# Patient Record
Sex: FEMALE | Race: WHITE | NOT HISPANIC OR LATINO | Employment: FULL TIME | URBAN - METROPOLITAN AREA
[De-identification: names, ages, dates, MRNs, and addresses within clinical notes are randomized per-mention and may not be internally consistent; named-entity substitution may affect disease eponyms.]

---

## 2024-06-18 ENCOUNTER — HOSPITAL ENCOUNTER (EMERGENCY)
Facility: HOSPITAL | Age: 39
Discharge: HOME | End: 2024-06-18
Attending: EMERGENCY MEDICINE
Payer: COMMERCIAL

## 2024-06-18 VITALS
SYSTOLIC BLOOD PRESSURE: 122 MMHG | TEMPERATURE: 99 F | WEIGHT: 162 LBS | HEART RATE: 56 BPM | RESPIRATION RATE: 18 BRPM | OXYGEN SATURATION: 100 % | HEIGHT: 60 IN | DIASTOLIC BLOOD PRESSURE: 74 MMHG | BODY MASS INDEX: 31.8 KG/M2

## 2024-06-18 DIAGNOSIS — S42.321A CLOSED DISPLACED TRANSVERSE FRACTURE OF SHAFT OF RIGHT HUMERUS, INITIAL ENCOUNTER: Primary | ICD-10-CM
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
## 2024-06-18 LAB — B-HCG UR QL: POSITIVE

## 2024-06-18 PROCEDURE — 99283 EMERGENCY DEPT VISIT LOW MDM: CPT | Mod: 25

## 2024-06-18 PROCEDURE — 25000003 PHARM REV CODE 250: Performed by: EMERGENCY MEDICINE

## 2024-06-18 PROCEDURE — 81025 URINE PREGNANCY TEST: CPT | Performed by: EMERGENCY MEDICINE

## 2024-06-18 RX ORDER — OXYCODONE AND ACETAMINOPHEN 5; 325 MG/1; MG/1
1 TABLET ORAL EVERY 6 HOURS PRN
Qty: 12 TABLET | Refills: 0 | Status: SHIPPED | OUTPATIENT
Start: 2024-06-18 | End: 2024-06-20 | Stop reason: SDUPTHER

## 2024-06-18 RX ORDER — HYDROCODONE BITARTRATE AND ACETAMINOPHEN 5; 325 MG/1; MG/1
1 TABLET ORAL
Status: COMPLETED | OUTPATIENT
Start: 2024-06-18 | End: 2024-06-18

## 2024-06-18 RX ADMIN — HYDROCODONE BITARTRATE AND ACETAMINOPHEN 1 TABLET: 5; 325 TABLET ORAL at 10:06

## 2024-06-18 NOTE — ED PROVIDER NOTES
Encounter Date: 6/18/2024       History     Chief Complaint   Patient presents with    Fall     C/o right shoulder and elbow pain , right hip and back pain s/p fall from desk (3 feet) onto her right side, denies hitting head, unwitnessed fall. Denies nausea.     39-year-old female history of anxiety sent from her doctor's office for right arm injury.  Patient fell off a desk about 3 ft high landing on her right arm.  Patient complains of right shoulder, right arm and right elbow pain.  She also complains of some low back pain.  No lower extremity numbness, weakness, saddle anesthesia, incontinence/retention.  Patient said she initially had numbness in her right hand and arm but something shifted and now she only has some numbness in her pinky.  Patient denies head trauma, loss of consciousness, neck pain.  No anticoagulation.  Patient was given Toradol 60 mg IM and placed in sling by her PCP    The history is provided by the patient.     Review of patient's allergies indicates:  No Known Allergies  No past medical history on file.  No past surgical history on file.  No family history on file.     Review of Systems   Constitutional:  Negative for chills, diaphoresis, fatigue and fever.   HENT:  Negative for congestion, drooling, ear discharge, ear pain, postnasal drip, rhinorrhea, sinus pressure, sinus pain, sore throat and tinnitus.    Eyes:  Negative for discharge.   Respiratory:  Negative for cough, chest tightness, shortness of breath and wheezing.    Cardiovascular:  Negative for chest pain and palpitations.   Gastrointestinal:  Negative for abdominal pain, diarrhea, nausea and vomiting.   Genitourinary:  Negative for frequency, hematuria and urgency.   Musculoskeletal:  Positive for back pain. Negative for neck pain and neck stiffness.   Skin:  Negative for rash.   Neurological:  Negative for syncope, weakness, light-headedness, numbness and headaches.   Psychiatric/Behavioral:  Negative for suicidal ideas.         Physical Exam     Initial Vitals [06/18/24 0944]   BP Pulse Resp Temp SpO2   121/79 82 18 98.6 °F (37 °C) 97 %      MAP       --         Physical Exam    Nursing note and vitals reviewed.  Constitutional: She appears well-developed and well-nourished. No distress.   HENT:   Head: Atraumatic.   Cardiovascular:  Normal rate.           Pulmonary/Chest: No respiratory distress. She exhibits no tenderness.   Abdominal: There is no abdominal tenderness.   Musculoskeletal:      Right shoulder: No swelling, deformity or bony tenderness. Decreased range of motion.      Right upper arm: Swelling, tenderness and bony tenderness present. No lacerations.      Right elbow: Decreased range of motion. Tenderness present.      Right forearm: No bony tenderness.      Right wrist: No bony tenderness. Normal range of motion. Normal pulse.      Right hand: No tenderness. Normal range of motion. Normal strength. Normal sensation.      Cervical back: Normal.      Thoracic back: Normal.      Lumbar back: No bony tenderness. Negative right straight leg raise test and negative left straight leg raise test.      Right hip: Normal.      Left hip: Normal.      Right upper leg: Normal.      Left upper leg: Normal.      Right knee: Normal.      Left knee: Normal.      Right lower leg: Normal.      Left lower leg: Normal.      Right ankle: Normal.      Left ankle: Normal.     Neurological: She is alert and oriented to person, place, and time. She has normal strength. No sensory deficit. GCS score is 15. GCS eye subscore is 4. GCS verbal subscore is 5. GCS motor subscore is 6.         ED Course   Procedures  Labs Reviewed   PREGNANCY TEST, URINE RAPID - Abnormal; Notable for the following components:       Result Value    hCG Qualitative, Urine Positive (*)     All other components within normal limits          Imaging Results              X-Ray Humerus 2 View Right (Final result)  Result time 06/18/24 11:09:36      Final result by Bennie  Umer FITZPATRICK MD (06/18/24 11:09:36)                   Impression:      Fracture of the midshaft humerus.      Electronically signed by: Umer Avery MD  Date:    06/18/2024  Time:    11:09               Narrative:    EXAMINATION:  XR HUMERUS 2 VIEW RIGHT    CLINICAL HISTORY:  Unspecified fall, initial encounter    COMPARISON:  None    FINDINGS:  There is a mildly oblique diaphyseal fracture of the midshaft humerus.  Fracture is displaced.                                       X-Ray Elbow Complete Right (Final result)  Result time 06/18/24 11:10:10      Final result by Umer Avery MD (06/18/24 11:10:10)                   Impression:      No acute abnormalities are seen      Electronically signed by: Umer Avery MD  Date:    06/18/2024  Time:    11:10               Narrative:    EXAMINATION:  XR ELBOW COMPLETE 3 VIEW RIGHT    CLINICAL HISTORY:  . Unspecified fall, initial encounter    TECHNIQUE:  AP, lateral, and oblique views of the right elbow were performed.    COMPARISON:  None    FINDINGS:  There are no acute fractures seen.  There is no dislocation.  There is no intra-articular effusion.                                       X-Ray Shoulder Trauma Right (Final result)  Result time 06/18/24 11:09:06      Final result by Umer Avery MD (06/18/24 11:09:06)                   Impression:      Diaphyseal fracture of the humerus.      Electronically signed by: Umer Avery MD  Date:    06/18/2024  Time:    11:09               Narrative:    EXAMINATION:  XR SHOULDER TRAUMA 3 VIEW RIGHT    CLINICAL HISTORY:  Unspecified fall, initial encounter    TECHNIQUE:  Three or four views of the right shoulder were performed.    COMPARISON:  None    FINDINGS:  There is an oblique fracture of the humeral diaphysis.  The humeral head is not dislocated.                                       Medications   HYDROcodone-acetaminophen 5-325 mg per tablet 1 tablet (1 tablet Oral Given 6/18/24 1014)     Medical Decision  Making  Medical Decision Making  Problem list/ differential diagnosis including but not limited to:  Head trauma, spinal cord injury, vertebral fracture, fracture of right upper extremity, nerve compression, arterial injury, rib fracture,      Patient's chronic illnesses impacting care:  none    Diagnostic test considered but not ordered:    My interpretations:  Humerus x-ray:  +Humerus fracture    Radiology reports      Discussion of case with external qualified healthcare professionals:  Not applicable    Review of external notes( inpt, ems, NH, clinic):      Decision rules/scores:    Medications reviewed:  Toradol IM  Medications ordered in the ER:  [place]  Discharge prescriptions:  Percocet    Social variables possible impacting patient's healthcare:    Code status/discussion    Shared decision making:    Consideration for admission versus discharge: stable for discharge     Amount and/or Complexity of Data Reviewed  Labs:  Decision-making details documented in ED Course.  Radiology: ordered.    Risk  Prescription drug management.               ED Course as of 06/18/24 1308   Tue Jun 18, 2024   1033 hCG Qualitative, Urine(!): Positive [WC]      ED Course User Index  [WC] Carroll Yao MD                           Clinical Impression:  Final diagnoses:  [W19.XXXA] Fall  [S42.321A] Closed displaced transverse fracture of shaft of right humerus, initial encounter (Primary)          ED Disposition Condition    Discharge Stable          ED Prescriptions       Medication Sig Dispense Start Date End Date Auth. Provider    oxyCODONE-acetaminophen (PERCOCET) 5-325 mg per tablet Take 1 tablet by mouth every 6 (six) hours as needed for Pain. 12 tablet 6/18/2024 -- Carroll Yao MD          Follow-up Information       Follow up With Specialties Details Why Contact Info    Edu Flores, DO Orthopedic Surgery On 6/20/2024  [facility] [facility]  [address]  [facility] [address]  [phone]      [facility]  Orthopaedics - Emergency Dept Emergency Medicine   [address]  [place] [id number] [phone]             Carroll Yao MD  06/18/24 8366

## 2024-06-18 NOTE — ED TRIAGE NOTES
C/o right shoulder and elbow pain , right hip and back pain s/p fall from desk (3 feet) onto her right side, denies hitting head, unwitnessed fall. Denies nausea.

## 2024-06-19 ENCOUNTER — LAB VISIT (OUTPATIENT)
Dept: LAB | Facility: HOSPITAL | Age: 39
End: 2024-06-19
Attending: FAMILY MEDICINE
Payer: COMMERCIAL

## 2024-06-19 DIAGNOSIS — Z3A.01 LESS THAN 8 WEEKS GESTATION OF PREGNANCY: Primary | ICD-10-CM

## 2024-06-19 DIAGNOSIS — S42.301A FRACTURE OF RIGHT HUMERUS: Primary | ICD-10-CM

## 2024-06-19 LAB — B-HCG FREE SERPL-ACNC: <2.42 MIU/ML

## 2024-06-19 PROCEDURE — 36415 COLL VENOUS BLD VENIPUNCTURE: CPT

## 2024-06-19 PROCEDURE — 84702 CHORIONIC GONADOTROPIN TEST: CPT

## 2024-06-20 ENCOUNTER — OFFICE VISIT (OUTPATIENT)
Dept: ORTHOPEDICS | Facility: CLINIC | Age: 39
End: 2024-06-20
Payer: COMMERCIAL

## 2024-06-20 ENCOUNTER — HOSPITAL ENCOUNTER (OUTPATIENT)
Facility: HOSPITAL | Age: 39
Discharge: HOME | End: 2024-06-20
Attending: ORTHOPAEDIC SURGERY | Admitting: ORTHOPAEDIC SURGERY
Payer: COMMERCIAL

## 2024-06-20 VITALS
WEIGHT: 162.06 LBS | BODY MASS INDEX: 31.82 KG/M2 | HEART RATE: 81 BPM | HEIGHT: 60 IN | SYSTOLIC BLOOD PRESSURE: 152 MMHG | DIASTOLIC BLOOD PRESSURE: 74 MMHG

## 2024-06-20 DIAGNOSIS — Z34.90 PREGNANCY, UNSPECIFIED GESTATIONAL AGE: ICD-10-CM

## 2024-06-20 DIAGNOSIS — S42.301A FRACTURE OF RIGHT HUMERUS: Primary | ICD-10-CM

## 2024-06-20 DIAGNOSIS — S42.321A CLOSED DISPLACED TRANSVERSE FRACTURE OF SHAFT OF RIGHT HUMERUS, INITIAL ENCOUNTER: ICD-10-CM

## 2024-06-20 DIAGNOSIS — S42.391A OTHER FRACTURE OF SHAFT OF RIGHT HUMERUS, INITIAL ENCOUNTER FOR CLOSED FRACTURE: Primary | ICD-10-CM

## 2024-06-20 PROCEDURE — 3008F BODY MASS INDEX DOCD: CPT | Mod: CPTII,,, | Performed by: ORTHOPAEDIC SURGERY

## 2024-06-20 PROCEDURE — 3078F DIAST BP <80 MM HG: CPT | Mod: CPTII,,, | Performed by: ORTHOPAEDIC SURGERY

## 2024-06-20 PROCEDURE — 99499 UNLISTED E&M SERVICE: CPT | Mod: ,,, | Performed by: ORTHOPAEDIC SURGERY

## 2024-06-20 PROCEDURE — 4010F ACE/ARB THERAPY RXD/TAKEN: CPT | Mod: CPTII,,, | Performed by: ORTHOPAEDIC SURGERY

## 2024-06-20 PROCEDURE — 99204 OFFICE O/P NEW MOD 45 MIN: CPT | Mod: 57,,, | Performed by: ORTHOPAEDIC SURGERY

## 2024-06-20 PROCEDURE — 1159F MED LIST DOCD IN RCRD: CPT | Mod: CPTII,,, | Performed by: ORTHOPAEDIC SURGERY

## 2024-06-20 PROCEDURE — 3077F SYST BP >= 140 MM HG: CPT | Mod: CPTII,,, | Performed by: ORTHOPAEDIC SURGERY

## 2024-06-20 RX ORDER — IBUPROFEN 800 MG/1
800 TABLET ORAL EVERY 6 HOURS PRN
COMMUNITY
Start: 2024-04-04

## 2024-06-20 RX ORDER — DOCUSATE SODIUM 100 MG/1
CAPSULE, LIQUID FILLED ORAL
COMMUNITY
Start: 2024-03-29

## 2024-06-20 RX ORDER — LABETALOL 200 MG/1
400 TABLET, FILM COATED ORAL
COMMUNITY
Start: 2024-03-29 | End: 2025-03-29

## 2024-06-20 RX ORDER — LEVOTHYROXINE SODIUM 88 UG/1
TABLET ORAL
COMMUNITY

## 2024-06-20 RX ORDER — MUPIROCIN 20 MG/G
OINTMENT TOPICAL
OUTPATIENT
Start: 2024-06-20

## 2024-06-20 RX ORDER — OXYCODONE AND ACETAMINOPHEN 5; 325 MG/1; MG/1
1 TABLET ORAL EVERY 4 HOURS PRN
Qty: 42 TABLET | Refills: 0 | Status: SHIPPED | OUTPATIENT
Start: 2024-06-20 | End: 2024-06-27

## 2024-06-20 RX ORDER — AMLODIPINE BESYLATE 5 MG/1
5 TABLET ORAL
COMMUNITY
Start: 2024-05-22

## 2024-06-20 RX ORDER — TRAZODONE HYDROCHLORIDE 50 MG/1
50 TABLET ORAL NIGHTLY PRN
COMMUNITY
Start: 2024-05-31

## 2024-06-20 RX ORDER — HYDROXYZINE HYDROCHLORIDE 50 MG/1
50 TABLET, FILM COATED ORAL EVERY 6 HOURS PRN
COMMUNITY
Start: 2024-04-01

## 2024-06-20 RX ORDER — PAROXETINE HYDROCHLORIDE 20 MG/1
1 TABLET, FILM COATED ORAL NIGHTLY
COMMUNITY
Start: 2024-03-29 | End: 2025-03-29

## 2024-06-20 RX ORDER — SODIUM CHLORIDE 0.9 % (FLUSH) 0.9 %
10 SYRINGE (ML) INJECTION
Status: SHIPPED | OUTPATIENT
Start: 2024-06-20

## 2024-06-20 RX ORDER — LOSARTAN POTASSIUM 100 MG/1
100 TABLET ORAL
COMMUNITY
Start: 2024-05-22

## 2024-06-20 RX ORDER — DESVENLAFAXINE SUCCINATE 50 MG/1
50 TABLET, EXTENDED RELEASE ORAL
COMMUNITY
Start: 2024-05-31

## 2024-06-20 RX ORDER — NAPROXEN SODIUM 220 MG/1
TABLET, FILM COATED ORAL
COMMUNITY

## 2024-06-20 RX ORDER — LORAZEPAM 1 MG/1
1 TABLET ORAL EVERY 4 HOURS PRN
COMMUNITY
Start: 2024-03-29

## 2024-06-20 RX ORDER — FERROUS SULFATE 325(65) MG
1 TABLET ORAL DAILY
COMMUNITY
Start: 2024-03-30

## 2024-06-20 NOTE — PROGRESS NOTES
Subjective:       Patient ID: Elissa Sorenson is a 39 y.o. female.  Chief Complaint   Patient presents with    Injury     2 days, RIGHT HUMERUS SHAFT FX, ER 6/18/24, states she was on a desk when she fell falling on her arm, has been in constant pain, presents in sling, has no other complaints at this time        HPI:  Patient is a 39-year-old female right-hand dominant.  She did have a positive pregnancy test 2 days ago at the hospital.  Recent beta hCG shows below normal levels.  There is unlikely chance she is pregnant although she is getting serial beta-hCGs.  Patient has no numbness no tingling she does have pain to the right humerus as expected.  She is right-hand dominant she is a nonsmoker currently moving her house.  She fell while climbing on a counter.  She is here with her mother today.    ROS:  Constitutional: Denies fever chills  Eyes: No change in vision  ENT: No ringing or current infections  CV: No chest pain  Resp: No labored breathing  MSK: Pain evident at site of injury located in HPI,   Integ: No signs of abrasions or lacerations  Neuro: No numbness or tingling  Lymphatic: No swelling outside the area of injury     Current Outpatient Medications on File Prior to Visit   Medication Sig Dispense Refill    amLODIPine (NORVASC) 5 MG tablet Take 5 mg by mouth.      aspirin 81 MG Chew Chew 1 tablet every day by oral route.      desvenlafaxine succinate (PRISTIQ) 50 MG Tb24 Take 50 mg by mouth.      docusate sodium (COLACE) 100 MG capsule Take by mouth.      ferrous sulfate (FEOSOL) 325 mg (65 mg iron) Tab tablet Take 1 tablet by mouth once daily.      hydrOXYzine (ATARAX) 50 MG tablet Take 50 mg by mouth every 6 (six) hours as needed.      ibuprofen (ADVIL,MOTRIN) 800 MG tablet Take 800 mg by mouth every 6 (six) hours as needed.      labetaloL (NORMODYNE) 200 MG tablet Take 400 mg by mouth.      levothyroxine (SYNTHROID) 88 MCG tablet       LORazepam (ATIVAN) 1 MG tablet Take 1 mg by mouth  "every 4 (four) hours as needed.      losartan (COZAAR) 100 MG tablet Take 100 mg by mouth.      oxyCODONE-acetaminophen (PERCOCET) 5-325 mg per tablet Take 1 tablet by mouth every 6 (six) hours as needed for Pain. 12 tablet 0    paroxetine (PAXIL) 20 MG tablet Take 1 tablet by mouth every evening.      traZODone (DESYREL) 50 MG tablet Take 50 mg by mouth nightly as needed.       No current facility-administered medications on file prior to visit.          Objective:      BP (!) 152/74   Pulse 81   Ht 5' (1.524 m)   Wt 73.5 kg (162 lb 0.6 oz)   BMI 31.65 kg/m²   General the patient is alert and oriented x3 no acute distress nontoxic-appearing appropriate affect.    Constitutional: Vital signs are examined and stable.  Resp: No signs of labored breathing              RUE: -Skin:  Splint intact No signs of new abrasions or lacerations, no scars           -MSK: STR 5/5 AIN/PIN/Median/Radial/Ulnar motor,           -Neuro:  Sensation intact to light touch C5-T1 dermatomes           -Lymphatic: No signs of  lymphadenopathy,            -CV:  Capillary refill is less than 2 seconds. Radial and ulnar pulses 2/4. Compartments soft and compressible        Body mass index is 31.65 kg/m².  Ideal body weight: 45.5 kg (100 lb 4.9 oz)  Adjusted ideal body weight: 56.7 kg (125 lb)  No results found for: "HGBA1C"  No results found for: "HGB"  No results found for: "ODHWQNRR90ZE"  No results found for: "WBC"    Radiology:  Two views right humerus June 18, 2024 showing short oblique versus transverse humeral shaft fracture completely displaced        Assessment:         1. Fracture of right humerus  Ambulatory referral/consult to Orthopedics    Vital signs    Cleanse with Chlorhexidine (CHG)    Diet NPO    Place VIBHA hose    Place sequential compression device    Chlorohexidine Gluconate Bath    Case Request Operating Room: ORIF, FRACTURE, HUMERUS    Full code    Place in Outpatient    Cleanse with Chlorhexidine (CHG)    Diet NPO    " Place VIBHA hose    Place sequential compression device      2. Pregnancy, unspecified gestational age  HCG, Quantitative              Plan:         No follow-ups on file.    Elissa was seen today for injury.    Diagnoses and all orders for this visit:    Fracture of right humerus  -     Ambulatory referral/consult to Orthopedics  -     Vital signs; Standing  -     Cleanse with Chlorhexidine (CHG); Standing  -     Diet NPO; Standing  -     Place VIBHA hose; Standing  -     Place sequential compression device; Standing  -     Chlorohexidine Gluconate Bath; Standing  -     Case Request Operating Room: ORIF, FRACTURE, HUMERUS  -     Full code; Standing  -     Place in Outpatient; Standing  -     Cleanse with Chlorhexidine (CHG)  -     Diet NPO  -     Place VIBHA hose  -     Place sequential compression device    Pregnancy, unspecified gestational age  -     HCG, Quantitative; Future    Other orders  -     sodium chloride 0.9% flush 10 mL  -     IP VTE LOW RISK PATIENT; Standing  -     mupirocin 2 % ointment  -     ceFAZolin (ANCEF) 2 g in dextrose 5 % (D5W) 50 mL IVPB  -     IP VTE LOW RISK PATIENT      Patient has a transverse versus oblique right humeral shaft fracture.  No numbness or tingling on today's exam.  She does have a positive pregnancy test from 2 days ago with scheduled serial beta-hCGs which will order 1 today.  Patient would like surgery today versus tomorrow but due to the positive pregnancy test we will need to confirm that she has not pregnant at this time.  We will place her on the OR schedule for outpatient surgery on Tuesday for operative fixation.  We have discussed the risks and benefits of the procedure in detail including a radial nerve palsy.    I explained that surgery and the nature of their condition are not without risks. These include, but are not limited to, bleeding, infection, neurovascular compromise, malunion, nonunion, hardware complications, wound complications, scarring, cosmetic  defects, need for later and/or repeated surgeries, avascular necrosis, bone death due to initial trauma, pain, loss of ROM, loss of function, PTOA, deformity, stance/gait and/or functional abnormalities, thromboembolic complications, compartment syndrome, loss of limb, loss of life, anesthetic complications, and other imponderables. I explained that these can occur despite the adequacy of treatments rendered, and that their risks are heightened given the nature of their condition.  I have also discussed the importance not using nicotine products due to the increased risk of infection surgical wound healing complications and nonunion of the fracture.  They verbalized understanding.  No guarantees were made.  They would like to continue with surgery at this time. If appropriate family was involved with surgical discussion.         This note/OR report was created with the assistance of  voice recognition software or phone  dictation.  There may be transcription errors as a result of using this technology however minimal. Effort has been made to assure accuracy of transcription but any obvious errors or omissions should be clarified with the author of the document.     Edu Flores DO  Orthopedic Trauma Surgery  06/20/2024      No future appointments.

## 2024-06-20 NOTE — H&P (VIEW-ONLY)
Subjective:       Patient ID: Elissa Sorenson is a 39 y.o. female.  Chief Complaint   Patient presents with    Injury     2 days, RIGHT HUMERUS SHAFT FX, ER 6/18/24, states she was on a desk when she fell falling on her arm, has been in constant pain, presents in sling, has no other complaints at this time        HPI:  Patient is a 39-year-old female right-hand dominant.  She did have a positive pregnancy test 2 days ago at the hospital.  Recent beta hCG shows below normal levels.  There is unlikely chance she is pregnant although she is getting serial beta-hCGs.  Patient has no numbness no tingling she does have pain to the right humerus as expected.  She is right-hand dominant she is a nonsmoker currently moving her house.  She fell while climbing on a counter.  She is here with her mother today.    ROS:  Constitutional: Denies fever chills  Eyes: No change in vision  ENT: No ringing or current infections  CV: No chest pain  Resp: No labored breathing  MSK: Pain evident at site of injury located in HPI,   Integ: No signs of abrasions or lacerations  Neuro: No numbness or tingling  Lymphatic: No swelling outside the area of injury     Current Outpatient Medications on File Prior to Visit   Medication Sig Dispense Refill    amLODIPine (NORVASC) 5 MG tablet Take 5 mg by mouth.      aspirin 81 MG Chew Chew 1 tablet every day by oral route.      desvenlafaxine succinate (PRISTIQ) 50 MG Tb24 Take 50 mg by mouth.      docusate sodium (COLACE) 100 MG capsule Take by mouth.      ferrous sulfate (FEOSOL) 325 mg (65 mg iron) Tab tablet Take 1 tablet by mouth once daily.      hydrOXYzine (ATARAX) 50 MG tablet Take 50 mg by mouth every 6 (six) hours as needed.      ibuprofen (ADVIL,MOTRIN) 800 MG tablet Take 800 mg by mouth every 6 (six) hours as needed.      labetaloL (NORMODYNE) 200 MG tablet Take 400 mg by mouth.      levothyroxine (SYNTHROID) 88 MCG tablet       LORazepam (ATIVAN) 1 MG tablet Take 1 mg by mouth  "every 4 (four) hours as needed.      losartan (COZAAR) 100 MG tablet Take 100 mg by mouth.      oxyCODONE-acetaminophen (PERCOCET) 5-325 mg per tablet Take 1 tablet by mouth every 6 (six) hours as needed for Pain. 12 tablet 0    paroxetine (PAXIL) 20 MG tablet Take 1 tablet by mouth every evening.      traZODone (DESYREL) 50 MG tablet Take 50 mg by mouth nightly as needed.       No current facility-administered medications on file prior to visit.          Objective:      BP (!) 152/74   Pulse 81   Ht 5' (1.524 m)   Wt 73.5 kg (162 lb 0.6 oz)   BMI 31.65 kg/m²   General the patient is alert and oriented x3 no acute distress nontoxic-appearing appropriate affect.    Constitutional: Vital signs are examined and stable.  Resp: No signs of labored breathing              RUE: -Skin:  Splint intact No signs of new abrasions or lacerations, no scars           -MSK: STR 5/5 AIN/PIN/Median/Radial/Ulnar motor,           -Neuro:  Sensation intact to light touch C5-T1 dermatomes           -Lymphatic: No signs of  lymphadenopathy,            -CV:  Capillary refill is less than 2 seconds. Radial and ulnar pulses 2/4. Compartments soft and compressible        Body mass index is 31.65 kg/m².  Ideal body weight: 45.5 kg (100 lb 4.9 oz)  Adjusted ideal body weight: 56.7 kg (125 lb)  No results found for: "HGBA1C"  No results found for: "HGB"  No results found for: "[id number]"  No results found for: "WBC"    Radiology:  Two views right humerus June 18, 2024 showing short oblique versus transverse humeral shaft fracture completely displaced        Assessment:         1. Fracture of right humerus  Ambulatory referral/consult to Orthopedics    Vital signs    Cleanse with Chlorhexidine (CHG)    Diet NPO    Place VIBHA hose    Place sequential compression device    Chlorohexidine Gluconate Bath    Case Request Operating Room: ORIF, FRACTURE, HUMERUS    Full code    Place in Outpatient    Cleanse with Chlorhexidine (CHG)    Diet NPO    " Place VIBHA hose    Place sequential compression device      2. Pregnancy, unspecified gestational age  HCG, Quantitative              Plan:         No follow-ups on file.    Elissa was seen today for injury.    Diagnoses and all orders for this visit:    Fracture of right humerus  -     Ambulatory referral/consult to Orthopedics  -     Vital signs; Standing  -     Cleanse with Chlorhexidine (CHG); Standing  -     Diet NPO; Standing  -     Place VIBHA hose; Standing  -     Place sequential compression device; Standing  -     Chlorohexidine Gluconate Bath; Standing  -     Case Request Operating Room: ORIF, FRACTURE, HUMERUS  -     Full code; Standing  -     Place in Outpatient; Standing  -     Cleanse with Chlorhexidine (CHG)  -     Diet NPO  -     Place VIBHA hose  -     Place sequential compression device    Pregnancy, unspecified gestational age  -     HCG, Quantitative; Future    Other orders  -     sodium chloride 0.9% flush 10 mL  -     IP VTE LOW RISK PATIENT; Standing  -     mupirocin 2 % ointment  -     ceFAZolin (ANCEF) 2 g in dextrose 5 % (D5W) 50 mL IVPB  -     IP VTE LOW RISK PATIENT      Patient has a transverse versus oblique right humeral shaft fracture.  No numbness or tingling on today's exam.  She does have a positive pregnancy test from 2 days ago with scheduled serial beta-hCGs which will order 1 today.  Patient would like surgery today versus tomorrow but due to the positive pregnancy test we will need to confirm that she has not pregnant at this time.  We will place her on the OR schedule for outpatient surgery on Tuesday for operative fixation.  We have discussed the risks and benefits of the procedure in detail including a radial nerve palsy.    I explained that surgery and the nature of their condition are not without risks. These include, but are not limited to, bleeding, infection, neurovascular compromise, malunion, nonunion, hardware complications, wound complications, scarring, cosmetic  defects, need for later and/or repeated surgeries, avascular necrosis, bone death due to initial trauma, pain, loss of ROM, loss of function, PTOA, deformity, stance/gait and/or functional abnormalities, thromboembolic complications, compartment syndrome, loss of limb, loss of life, anesthetic complications, and other imponderables. I explained that these can occur despite the adequacy of treatments rendered, and that their risks are heightened given the nature of their condition.  I have also discussed the importance not using nicotine products due to the increased risk of infection surgical wound healing complications and nonunion of the fracture.  They verbalized understanding.  No guarantees were made.  They would like to continue with surgery at this time. If appropriate family was involved with surgical discussion.         This note/OR report was created with the assistance of  voice recognition software or phone  dictation.  There may be transcription errors as a result of using this technology however minimal. Effort has been made to assure accuracy of transcription but any obvious errors or omissions should be clarified with the author of the document.     Edu Flores DO  Orthopedic Trauma Surgery  06/20/2024      No future appointments.

## 2024-06-21 ENCOUNTER — PATIENT MESSAGE (OUTPATIENT)
Dept: ORTHOPEDICS | Facility: CLINIC | Age: 39
End: 2024-06-21
Payer: COMMERCIAL

## 2024-06-21 ENCOUNTER — LAB VISIT (OUTPATIENT)
Dept: LAB | Facility: HOSPITAL | Age: 39
End: 2024-06-21
Attending: FAMILY MEDICINE
Payer: COMMERCIAL

## 2024-06-21 ENCOUNTER — ANESTHESIA EVENT (OUTPATIENT)
Dept: SURGERY | Facility: HOSPITAL | Age: 39
End: 2024-06-21
Payer: COMMERCIAL

## 2024-06-21 DIAGNOSIS — Z34.90 PREGNANCY, UNSPECIFIED GESTATIONAL AGE: Primary | ICD-10-CM

## 2024-06-21 DIAGNOSIS — Z3A.01 LESS THAN 8 WEEKS GESTATION OF PREGNANCY: ICD-10-CM

## 2024-06-21 LAB — B-HCG FREE SERPL-ACNC: <2.42 MIU/ML

## 2024-06-21 PROCEDURE — 84702 CHORIONIC GONADOTROPIN TEST: CPT

## 2024-06-21 PROCEDURE — 36415 COLL VENOUS BLD VENIPUNCTURE: CPT

## 2024-06-21 RX ORDER — ASCORBIC ACID 250 MG
1 TABLET,CHEWABLE ORAL DAILY
COMMUNITY

## 2024-06-24 DIAGNOSIS — S42.321A CLOSED DISPLACED TRANSVERSE FRACTURE OF SHAFT OF RIGHT HUMERUS, INITIAL ENCOUNTER: ICD-10-CM

## 2024-06-24 NOTE — PRE-PROCEDURE INSTRUCTIONS
"[facility]: Outpatient Surgery  Preprocedure Instructions    Expectations: "Because of inconsistent procedure completion times, an unexpected wait may occur. The physicians would like you to be here to prepare in the event they run ahead of time. We will make you as comfortable as possible and keep you informed. We apologize in advance if this happens."     Your arrival time for your surgery or procedure is _5 am_____.  We ask patients to arrive about 2 hours before surgery to allow for enough time to review your health history & medications, start your IV, complete any outstanding labwork or tests, and meet your Anesthesiologist.    We are located at [facility], [address] [place] [address].    Enter through the [place] entrance next to the Emergency Room, and come to the 6th floor to the Outpatient Surgery Department.    If you are in need of a wheelchair the morning of surgery please call [phone] about 15 minutes before you arrive. Parking is available in our parking garage located off [facility], between the hospital and [facility].      Visitory Policy:  You are allowed 2 adult visitors to be with you in the hospital. Please, no switching visitors in pre-op area. All hospital visitors should be in good current health.  No small children.  We will update you and your family hourly on the progression of surgery and any unexpected delays.      What to Bring:  Please have your ID, insurance cards, and all home medication bottles with you at check in.  Bring your CPAP machine if one is used at home.     Fasting:  Nothing to eat or drink after midnight the night before your procedure. This includes no ice, gum, hard candies, and/or tobacco products.    Medications:  Follow your doctor's instructions for taking any medications on the morning of your procedure.  If no instructions for taking medications were given, do not take any medications but bring your " medications in their bottles to your procedure check in.     Follow your doctor's preoperative instructions regarding skin prep, bowel prep, bathing, or showering prior to your procedure.  If any special soaps were provided to you, please use according to your doctor's instructions. If no instructions were given from your doctor, take a good bath or shower with antibacterial soap the night before and the morning of your procedure.  On the morning of procedure, wear loose, comfortable clothing.  No lotions, makeup, perfumes, colognes, deodorant, or jewelry to your procedure.  Removable items (glasses, contact lenses, dentures, retainers, hearing aids) need to be removed for your procedure.  Bring your storage containers for these items if you wear them.     Artificial nails, body jewelry, eyelash extensions, and/or hair extensions with metal clips are not allowed during your surgery.  If you currently wear any of these items, please arrange for them to be removed prior to your arrival to the hospital.     Outpatient or Same Day Surgeries:  Any patients receiving sedation/anesthesia are advised not to drive for 24 hours after their procedure.  We do not allow patients to drive themselves home after discharge.  If you are going home after your procedure, please have someone available to drive you home from the hospital.     You may call the Outpatient Surgery Department at [phone] with any questions or concerns.  We are looking forward to meeting you and taking great care of you for your procedure.  Thank you for choosing [facility] for your surgical needs.

## 2024-06-24 NOTE — ANESTHESIA PREPROCEDURE EVALUATION
06/24/2024  Elissa Sorenson is a 39 y.o., female, who presents for the following:    Procedure: ORIF, FRACTURE, HUMERUS (Right: Arm Upper) - RIGHT //  LATERAL BEAN BAG // VASCULAR BACKWARDS // SKELETAL DYNAMICS // C-ARM   Anesthesia type: General   Diagnosis: Closed fracture of right upper extremity, initial encounter [S42.301A]   Pre-op diagnosis: Closed fracture of right upper extremity, initial encounter [S42.301A]   Location: [facility] OR [address] [place] [address] OR   Surgeons: Edu Flores, DO     LAB: UPT - neg    Labetalol po last night      Pre-op Assessment    I have reviewed the Patient Summary Reports.     I have reviewed the Nursing Notes. I have reviewed the NPO Status.   I have reviewed the Medications.     Review of Systems  Anesthesia Hx:  No problems with previous Anesthesia             Denies Family Hx of Anesthesia complications.    Denies Personal Hx of Anesthesia complications.                    Social:  Non-Smoker       Cardiovascular:     Hypertension                                        Pulmonary:  Pulmonary Normal                       Endocrine:   Hypothyroidism          Psych:    depression                Physical Exam  General: Alert and Oriented    Airway:  Mallampati: II   Mouth Opening: Normal  TM Distance: Normal  Tongue: Normal  Neck ROM: Normal ROM    Dental:  Intact    Chest/Lungs:  Normal Respiratory Rate    Heart:  Rate: Normal  Rhythm: Regular Rhythm        Anesthesia Plan  Type of Anesthesia, risks & benefits discussed:    Anesthesia Type: Gen ETT  Intra-op Monitoring Plan: Standard ASA Monitors  Post Op Pain Control Plan: IV/PO Opioids PRN, multimodal analgesia and peripheral nerve block  Induction:  IV  Airway Plan: Direct, Post-Induction  Informed Consent: Informed consent signed with the Patient and all parties understand the risks and agree with anesthesia plan.  All  questions answered. Patient consented to blood products? Yes  ASA Score: 2  Day of Surgery Review of History & Physical: H&P Update referred to the surgeon/provider.  Anesthesia Plan Notes: ERAS / Multiple Antiemetics  Supraclavicular Block for post-operative analgesia, per surgeon request    Ready For Surgery From Anesthesia Perspective.     .

## 2024-06-25 ENCOUNTER — ANESTHESIA (OUTPATIENT)
Dept: SURGERY | Facility: HOSPITAL | Age: 39
End: 2024-06-25
Payer: COMMERCIAL

## 2024-06-25 ENCOUNTER — HOSPITAL ENCOUNTER (OUTPATIENT)
Facility: HOSPITAL | Age: 39
Discharge: HOME | End: 2024-06-25
Attending: ORTHOPAEDIC SURGERY | Admitting: ORTHOPAEDIC SURGERY
Payer: COMMERCIAL

## 2024-06-25 VITALS
HEART RATE: 78 BPM | SYSTOLIC BLOOD PRESSURE: 134 MMHG | BODY MASS INDEX: 33.46 KG/M2 | OXYGEN SATURATION: 97 % | RESPIRATION RATE: 20 BRPM | WEIGHT: 170.44 LBS | HEIGHT: 60 IN | TEMPERATURE: 98 F | DIASTOLIC BLOOD PRESSURE: 86 MMHG

## 2024-06-25 DIAGNOSIS — Z34.90 PREGNANCY, UNSPECIFIED GESTATIONAL AGE: ICD-10-CM

## 2024-06-25 DIAGNOSIS — S42.301A FRACTURE OF RIGHT HUMERUS: Primary | ICD-10-CM

## 2024-06-25 LAB
B-HCG UR QL: NEGATIVE
CTP QC/QA: YES

## 2024-06-25 PROCEDURE — 25000003 PHARM REV CODE 250: Performed by: ANESTHESIOLOGY

## 2024-06-25 PROCEDURE — 71000015 HC POSTOP RECOV 1ST HR: Performed by: ORTHOPAEDIC SURGERY

## 2024-06-25 PROCEDURE — 24515 OPTX HUMRL SHFT FX PLATE/SCR: CPT | Mod: RT,,, | Performed by: ORTHOPAEDIC SURGERY

## 2024-06-25 PROCEDURE — 24515 OPTX HUMRL SHFT FX PLATE/SCR: CPT | Mod: AS,RT,, | Performed by: PHYSICIAN ASSISTANT

## 2024-06-25 PROCEDURE — 37000008 HC ANESTHESIA 1ST 15 MINUTES: Performed by: ORTHOPAEDIC SURGERY

## 2024-06-25 PROCEDURE — 25000003 PHARM REV CODE 250

## 2024-06-25 PROCEDURE — 37000009 HC ANESTHESIA EA ADD 15 MINS: Performed by: ORTHOPAEDIC SURGERY

## 2024-06-25 PROCEDURE — 63600175 PHARM REV CODE 636 W HCPCS: Performed by: ANESTHESIOLOGY

## 2024-06-25 PROCEDURE — 27201423 OPTIME MED/SURG SUP & DEVICES STERILE SUPPLY: Performed by: ORTHOPAEDIC SURGERY

## 2024-06-25 PROCEDURE — 36000711: Performed by: ORTHOPAEDIC SURGERY

## 2024-06-25 PROCEDURE — 71000016 HC POSTOP RECOV ADDL HR: Performed by: ORTHOPAEDIC SURGERY

## 2024-06-25 PROCEDURE — 63600175 PHARM REV CODE 636 W HCPCS

## 2024-06-25 PROCEDURE — 71000033 HC RECOVERY, INTIAL HOUR: Performed by: ORTHOPAEDIC SURGERY

## 2024-06-25 PROCEDURE — C1713 ANCHOR/SCREW BN/BN,TIS/BN: HCPCS | Performed by: ORTHOPAEDIC SURGERY

## 2024-06-25 PROCEDURE — 81025 URINE PREGNANCY TEST: CPT | Performed by: ORTHOPAEDIC SURGERY

## 2024-06-25 PROCEDURE — 64415 NJX AA&/STRD BRCH PLXS IMG: CPT | Performed by: ANESTHESIOLOGY

## 2024-06-25 PROCEDURE — 36000710: Performed by: ORTHOPAEDIC SURGERY

## 2024-06-25 PROCEDURE — 63600175 PHARM REV CODE 636 W HCPCS: Performed by: ORTHOPAEDIC SURGERY

## 2024-06-25 DEVICE — IMPLANTABLE DEVICE: Type: IMPLANTABLE DEVICE | Site: HUMERUS | Status: FUNCTIONAL

## 2024-06-25 RX ORDER — ONDANSETRON HYDROCHLORIDE 2 MG/ML
INJECTION, SOLUTION INTRAVENOUS
Status: DISCONTINUED | OUTPATIENT
Start: 2024-06-25 | End: 2024-06-25

## 2024-06-25 RX ORDER — ONDANSETRON HYDROCHLORIDE 2 MG/ML
4 INJECTION, SOLUTION INTRAVENOUS ONCE AS NEEDED
Status: DISCONTINUED | OUTPATIENT
Start: 2024-06-25 | End: 2024-06-25 | Stop reason: HOSPADM

## 2024-06-25 RX ORDER — SODIUM CHLORIDE, SODIUM LACTATE, POTASSIUM CHLORIDE, CALCIUM CHLORIDE 600; 310; 30; 20 MG/100ML; MG/100ML; MG/100ML; MG/100ML
INJECTION, SOLUTION INTRAVENOUS CONTINUOUS
Status: DISCONTINUED | OUTPATIENT
Start: 2024-06-25 | End: 2024-06-25 | Stop reason: HOSPADM

## 2024-06-25 RX ORDER — OXYCODONE AND ACETAMINOPHEN 5; 325 MG/1; MG/1
1 TABLET ORAL EVERY 4 HOURS PRN
Status: DISCONTINUED | OUTPATIENT
Start: 2024-06-25 | End: 2024-06-25 | Stop reason: HOSPADM

## 2024-06-25 RX ORDER — GABAPENTIN 300 MG/1
300 CAPSULE ORAL
Status: COMPLETED | OUTPATIENT
Start: 2024-06-25 | End: 2024-06-25

## 2024-06-25 RX ORDER — OXYCODONE AND ACETAMINOPHEN 10; 325 MG/1; MG/1
1 TABLET ORAL EVERY 4 HOURS PRN
Qty: 42 TABLET | Refills: 0 | Status: SHIPPED | OUTPATIENT
Start: 2024-06-25 | End: 2024-07-02 | Stop reason: SDUPTHER

## 2024-06-25 RX ORDER — MUPIROCIN 20 MG/G
OINTMENT TOPICAL
Status: DISCONTINUED | OUTPATIENT
Start: 2024-06-25 | End: 2024-06-25 | Stop reason: HOSPADM

## 2024-06-25 RX ORDER — ONDANSETRON HYDROCHLORIDE 2 MG/ML
4 INJECTION, SOLUTION INTRAVENOUS EVERY 6 HOURS PRN
Status: DISCONTINUED | OUTPATIENT
Start: 2024-06-25 | End: 2024-06-25 | Stop reason: HOSPADM

## 2024-06-25 RX ORDER — ACETAMINOPHEN 500 MG
1000 TABLET ORAL
Status: COMPLETED | OUTPATIENT
Start: 2024-06-25 | End: 2024-06-25

## 2024-06-25 RX ORDER — OXYCODONE AND ACETAMINOPHEN 10; 325 MG/1; MG/1
1 TABLET ORAL EVERY 4 HOURS PRN
Status: DISCONTINUED | OUTPATIENT
Start: 2024-06-25 | End: 2024-06-25 | Stop reason: HOSPADM

## 2024-06-25 RX ORDER — HYDRALAZINE HYDROCHLORIDE 20 MG/ML
10 INJECTION INTRAMUSCULAR; INTRAVENOUS ONCE
Status: DISCONTINUED | OUTPATIENT
Start: 2024-06-25 | End: 2024-06-25 | Stop reason: HOSPADM

## 2024-06-25 RX ORDER — CEFAZOLIN SODIUM 1 G/3ML
INJECTION, POWDER, FOR SOLUTION INTRAMUSCULAR; INTRAVENOUS
Status: DISCONTINUED | OUTPATIENT
Start: 2024-06-25 | End: 2024-06-25

## 2024-06-25 RX ORDER — LIDOCAINE HYDROCHLORIDE 20 MG/ML
INJECTION INTRAVENOUS
Status: DISCONTINUED | OUTPATIENT
Start: 2024-06-25 | End: 2024-06-25

## 2024-06-25 RX ORDER — VANCOMYCIN HYDROCHLORIDE 1 G/20ML
INJECTION, POWDER, LYOPHILIZED, FOR SOLUTION INTRAVENOUS
Status: DISCONTINUED | OUTPATIENT
Start: 2024-06-25 | End: 2024-06-25 | Stop reason: HOSPADM

## 2024-06-25 RX ORDER — PHENYLEPHRINE HYDROCHLORIDE 10 MG/ML
INJECTION INTRAVENOUS
Status: DISCONTINUED | OUTPATIENT
Start: 2024-06-25 | End: 2024-06-25

## 2024-06-25 RX ORDER — LABETALOL HYDROCHLORIDE 5 MG/ML
15 INJECTION, SOLUTION INTRAVENOUS ONCE
Status: COMPLETED | OUTPATIENT
Start: 2024-06-25 | End: 2024-06-25

## 2024-06-25 RX ORDER — DEXAMETHASONE SODIUM PHOSPHATE 4 MG/ML
INJECTION, SOLUTION INTRA-ARTICULAR; INTRALESIONAL; INTRAMUSCULAR; INTRAVENOUS; SOFT TISSUE
Status: DISCONTINUED | OUTPATIENT
Start: 2024-06-25 | End: 2024-06-25

## 2024-06-25 RX ORDER — DEXMEDETOMIDINE HYDROCHLORIDE 100 UG/ML
INJECTION, SOLUTION INTRAVENOUS
Status: DISCONTINUED | OUTPATIENT
Start: 2024-06-25 | End: 2024-06-25

## 2024-06-25 RX ORDER — LABETALOL HYDROCHLORIDE 5 MG/ML
INJECTION, SOLUTION INTRAVENOUS
Status: COMPLETED
Start: 2024-06-25 | End: 2024-06-25

## 2024-06-25 RX ORDER — ROPIVACAINE HYDROCHLORIDE 5 MG/ML
INJECTION, SOLUTION EPIDURAL; INFILTRATION; PERINEURAL
Status: COMPLETED | OUTPATIENT
Start: 2024-06-25 | End: 2024-06-25

## 2024-06-25 RX ORDER — ROCURONIUM BROMIDE 10 MG/ML
INJECTION, SOLUTION INTRAVENOUS
Status: DISCONTINUED | OUTPATIENT
Start: 2024-06-25 | End: 2024-06-25

## 2024-06-25 RX ORDER — PROPOFOL 10 MG/ML
VIAL (ML) INTRAVENOUS
Status: DISCONTINUED | OUTPATIENT
Start: 2024-06-25 | End: 2024-06-25

## 2024-06-25 RX ORDER — FENTANYL CITRATE 50 UG/ML
INJECTION, SOLUTION INTRAMUSCULAR; INTRAVENOUS
Status: DISCONTINUED | OUTPATIENT
Start: 2024-06-25 | End: 2024-06-25

## 2024-06-25 RX ORDER — CEFAZOLIN SODIUM 2 G/50ML
2 SOLUTION INTRAVENOUS
Status: DISCONTINUED | OUTPATIENT
Start: 2024-06-25 | End: 2024-06-25 | Stop reason: HOSPADM

## 2024-06-25 RX ORDER — MORPHINE SULFATE 4 MG/ML
4 INJECTION, SOLUTION INTRAMUSCULAR; INTRAVENOUS EVERY 6 HOURS PRN
Status: DISCONTINUED | OUTPATIENT
Start: 2024-06-25 | End: 2024-06-25 | Stop reason: HOSPADM

## 2024-06-25 RX ORDER — ROPIVACAINE HYDROCHLORIDE 5 MG/ML
40 INJECTION, SOLUTION EPIDURAL; INFILTRATION; PERINEURAL ONCE
Status: DISCONTINUED | OUTPATIENT
Start: 2024-06-25 | End: 2024-06-25 | Stop reason: HOSPADM

## 2024-06-25 RX ORDER — LOSARTAN POTASSIUM 50 MG/1
50 TABLET ORAL DAILY
COMMUNITY
Start: 2024-05-07

## 2024-06-25 RX ORDER — HYDROMORPHONE HYDROCHLORIDE 2 MG/ML
0.4 INJECTION, SOLUTION INTRAMUSCULAR; INTRAVENOUS; SUBCUTANEOUS EVERY 5 MIN PRN
Status: DISCONTINUED | OUTPATIENT
Start: 2024-06-25 | End: 2024-06-25 | Stop reason: HOSPADM

## 2024-06-25 RX ORDER — ONDANSETRON HYDROCHLORIDE 2 MG/ML
4 INJECTION, SOLUTION INTRAVENOUS
Status: COMPLETED | OUTPATIENT
Start: 2024-06-25 | End: 2024-06-25

## 2024-06-25 RX ORDER — MIDAZOLAM HYDROCHLORIDE 2 MG/2ML
2 INJECTION, SOLUTION INTRAMUSCULAR; INTRAVENOUS ONCE AS NEEDED
Status: COMPLETED | OUTPATIENT
Start: 2024-06-25 | End: 2024-06-25

## 2024-06-25 RX ORDER — METHOCARBAMOL 750 MG/1
750 TABLET, FILM COATED ORAL 3 TIMES DAILY
Qty: 30 TABLET | Refills: 0 | Status: SHIPPED | OUTPATIENT
Start: 2024-06-25 | End: 2024-07-02 | Stop reason: SDUPTHER

## 2024-06-25 RX ORDER — METHOCARBAMOL 750 MG/1
750 TABLET, FILM COATED ORAL 3 TIMES DAILY
Status: DISCONTINUED | OUTPATIENT
Start: 2024-06-25 | End: 2024-06-25 | Stop reason: HOSPADM

## 2024-06-25 RX ADMIN — LIDOCAINE HYDROCHLORIDE 80 MG: 20 INJECTION INTRAVENOUS at 07:06

## 2024-06-25 RX ADMIN — HYDROMORPHONE HYDROCHLORIDE 0.4 MG: 2 INJECTION, SOLUTION INTRAMUSCULAR; INTRAVENOUS; SUBCUTANEOUS at 09:06

## 2024-06-25 RX ADMIN — PHENYLEPHRINE HYDROCHLORIDE 100 MCG: 10 INJECTION INTRAVENOUS at 08:06

## 2024-06-25 RX ADMIN — DEXMEDETOMIDINE 5 MCG: 200 INJECTION, SOLUTION INTRAVENOUS at 07:06

## 2024-06-25 RX ADMIN — SUGAMMADEX 200 MG: 100 INJECTION, SOLUTION INTRAVENOUS at 08:06

## 2024-06-25 RX ADMIN — MIDAZOLAM HYDROCHLORIDE 2 MG: 1 INJECTION, SOLUTION INTRAMUSCULAR; INTRAVENOUS at 06:06

## 2024-06-25 RX ADMIN — DEXAMETHASONE SODIUM PHOSPHATE 8 MG: 4 INJECTION, SOLUTION INTRA-ARTICULAR; INTRALESIONAL; INTRAMUSCULAR; INTRAVENOUS; SOFT TISSUE at 07:06

## 2024-06-25 RX ADMIN — PHENYLEPHRINE HYDROCHLORIDE 100 MCG: 10 INJECTION INTRAVENOUS at 07:06

## 2024-06-25 RX ADMIN — LABETALOL HYDROCHLORIDE 15 MG: 5 INJECTION, SOLUTION INTRAVENOUS at 09:06

## 2024-06-25 RX ADMIN — ROCURONIUM BROMIDE 50 MG: 10 SOLUTION INTRAVENOUS at 07:06

## 2024-06-25 RX ADMIN — PROPOFOL 50 MG: 10 INJECTION, EMULSION INTRAVENOUS at 07:06

## 2024-06-25 RX ADMIN — ONDANSETRON 4 MG: 2 INJECTION INTRAMUSCULAR; INTRAVENOUS at 05:06

## 2024-06-25 RX ADMIN — ACETAMINOPHEN 1000 MG: 500 TABLET ORAL at 05:06

## 2024-06-25 RX ADMIN — CEFAZOLIN 2 G: 330 INJECTION, POWDER, FOR SOLUTION INTRAMUSCULAR; INTRAVENOUS at 07:06

## 2024-06-25 RX ADMIN — GABAPENTIN 300 MG: 300 CAPSULE ORAL at 05:06

## 2024-06-25 RX ADMIN — ROPIVACAINE HYDROCHLORIDE 35 ML: 5 INJECTION, SOLUTION EPIDURAL; INFILTRATION; PERINEURAL at 06:06

## 2024-06-25 RX ADMIN — PROPOFOL 150 MG: 10 INJECTION, EMULSION INTRAVENOUS at 07:06

## 2024-06-25 RX ADMIN — SODIUM CHLORIDE, SODIUM GLUCONATE, SODIUM ACETATE, POTASSIUM CHLORIDE AND MAGNESIUM CHLORIDE: 526; 502; 368; 37; 30 INJECTION, SOLUTION INTRAVENOUS at 07:06

## 2024-06-25 RX ADMIN — FENTANYL CITRATE 100 MCG: 50 INJECTION, SOLUTION INTRAMUSCULAR; INTRAVENOUS at 07:06

## 2024-06-25 RX ADMIN — ONDANSETRON 8 MG: 2 INJECTION INTRAMUSCULAR; INTRAVENOUS at 08:06

## 2024-06-25 NOTE — OP NOTE
OPERATIVE REPORT    Patient: Elissa Sorenson   : 1985    MRN: [id number]  Date: 2024      Surgeon:Edu Flores DO  Assistant: Barrett Magaña was essential, part of the procedure including deep hardware placement and deep closure.  No senior assistant was availible  Preoperative Diagnosis:Right humeral shaft fracture, morbid obesity  Postoperative Diagnosis: Same  Procedure:    1) Open reduction and internal fixation right humeral shaft fracture - CPT 62782  Anesthesiologist: Panfilo Folres MD  OR Staff: Circulator: Gisselle Palacios RN  Radiology Technologist: Keily Melvin RT  Scrub Person: Kayli Green ST  Implants:  Skeletal Dynamics  Implant Name Type Inv. Item Serial No.  Lot No. LRB No. Used Action   DYNACLIP FORTE BONE FIXATION SYSTEM     [id number] Right 1 Implanted and Explanted     EBL:  50 cc  Complications: None  Disposition: To PACU, stable    Indications: Elissa Sorenson is a 39 y.o. female presenting with the aforementioned injuries/findings. The procedure is indicated to best obtain and maintain stability and alignment about the humerus.  The patient is awake and alert. After thorough discussion of the risks, benefits, expected outcomes, and alternatives to surgical intervention (including nonoperative management), the patient agreed to proceed with surgical treatment.  Specific risks discussed included, but were not limited to: superficial or deep infection, wound healing complications, DVT/PE, significant bleeding requiring transfusion, damage to named anatomic structures in the immediate area including named neurovascular structures, malunion/nonunion, implant failure, injury to the radial and/or ulnar nerve with significant motor or sensory dysfunction, and general risks of anesthesia.  The patient voiced understanding and written as well as verbal consent was obtained by myself prior to the procedure.    Procedure Note:  The patient was  brought back to the OR and placed supine on the OR table. After successful induction of anesthesia by anesthesia staff, the patient was positioned in the lateral decubitus position and all bony prominences were padded appropriately.  The surgical field was then provisionally cleansed and then prepped and draped in the usual sterile fashion.    At this time a time-out was performed, with the correct patient, site, and procedure identified.  The universal time out as well as sign your site protocols were followed.  Preoperative antibiotics were verified as administered.     We elected to utilize a posterior approach to the humerus.  Attention to hemostasis was paid using electrocautery.  The approach easily led to the fracture without difficulty, and interposing periosteum and clot were removed.  The fracture fragments were realigned and provisionally held.  We placed a staple which was removed.  We then placed a skeletal Dynamics plate (see above) into position and this was affixed to bone proximally as well as distally using appropriate length screws.  Of note, the radial nerve was seen and protected throughout the procedure, over the #3 screw proximal.  Final C-arm images were obtained and saved to the [employer] system.      The incisions were then irrigated using copious sterile saline and then closed in layered fashion.  The surgical sites were sterilely cleansed and dressed.    The patient was then subsequently removed from anesthetic and transferred to to PACU in a stable condition.    All sponge and needle counts were correct at the end of the case.  I was present and participated in all aspects of the procedure.    Prognosis:  The patient will be kept NWB ROMAT on the ipsilateral extremity for 8 weeks.  DVT prophylaxis is not indicated for this patient and procedure.  The patient is at risk of pain and stiffness of the arm, and we will allow immediate shoulder and elbow ROM to minimize this.          This  note/OR report was created with the assistance of  voice recognition software or phone  dictation.  There may be transcription errors as a result of using this technology however minimal. Effort has been made to assure accuracy of transcription but any obvious errors or omissions should be clarified with the author of the document.       Edu Flores, DO  Orthopedic Trauma Surgery

## 2024-06-25 NOTE — PROGRESS NOTES
Called patients  immediately after surgery. We then had a subsequent conversation at 5 PM about questions about the surgery with the patient.  Patient appears to be doing well. Block is wearing off and wrist extension appears to be intact. We discussed the surgical technique, operative approach, stabilization that we chose to fix the fracture. We also discussed Risks of her surgery. I think shell do very well. We Will check in with her tomorrow to confirm that her pain is still well-controlled.      Mark

## 2024-06-25 NOTE — INTERVAL H&P NOTE
The patient has been examined and the H&P has been reviewed:    I concur with the findings and no changes have occurred since H&P was written.    Surgery risks, benefits and alternative options discussed and understood by patient/family.    I explained that surgery and the nature of their condition are not without risks. These include, but are not limited to, bleeding, infection, neurovascular compromise, malunion, nonunion, hardware complications, wound complications, scarring, cosmetic defects, need for later and/or repeated surgeries, avascular necrosis, bone death due to initial trauma, pain, loss of ROM, loss of function, PTOA, deformity, stance/gait and/or functional abnormalities, thromboembolic complications, compartment syndrome, loss of limb, loss of life, anesthetic complications, and other imponderables. I explained that these can occur despite the adequacy of treatments rendered, and that their risks are heightened given the nature of their condition.  I have also discussed the importance not using nicotine products due to the increased risk of infection surgical wound healing complications and nonunion of the fracture.  They verbalized understanding.  No guarantees were made.  They would like to continue with surgery at this time. If appropriate family was involved with surgical discussion.     This note/OR report was created with the assistance of  voice recognition software or phone  dictation.  There may be transcription errors as a result of using this technology however minimal. Effort has been made to assure accuracy of transcription but any obvious errors or omissions should be clarified with the author of the document.       Edu Flores, DO  Orthopedic Trauma Surgery       There are no hospital problems to display for this patient.    
97

## 2024-06-25 NOTE — ANESTHESIA PROCEDURE NOTES
Intubation    Date/Time: 6/25/2024 7:19 AM    Performed by: Ludivina Barnett CRNA  Authorized by: Panfilo Flores MD    Intubation:     Induction:  Intravenous    Intubated:  Postinduction    Mask Ventilation:  Easy with oral airway    Attempts:  1    Attempted By:  CRNA    Method of Intubation:  Direct    Blade:  Janis 3    Laryngeal View Grade: Grade IIA - cords partially seen      Difficult Airway Encountered?: No      Complications:  None    Airway Device:  Oral endotracheal tube    Airway Device Size:  7.0    Style/Cuff Inflation:  Cuffed    Tube secured:  22    Secured at:  The teeth    Placement Verified By:  Capnometry    Complicating Factors:  None    Findings Post-Intubation:  BS equal bilateral and atraumatic/condition of teeth unchanged

## 2024-06-25 NOTE — ANESTHESIA POSTPROCEDURE EVALUATION
Anesthesia Post Evaluation    Patient: Elissa Sorenson    Procedure(s) Performed: Procedure(s) (LRB):  ORIF, FRACTURE, HUMERUS (Right)    Final Anesthesia Type: general      Patient location during evaluation: PACU  Patient participation: Yes- Able to Participate  Level of consciousness: oriented and sedated  Post-procedure vital signs: reviewed and stable  Pain management: adequate  Airway patency: patent    PONV status at discharge: No PONV  Anesthetic complications: no      Cardiovascular status: blood pressure returned to baseline and stable  Respiratory status: spontaneous ventilation and unassisted  Hydration status: euvolemic  Follow-up not needed.  Comments: [facility]        Neuro: stable peripheral nerve block      Vitals Value Taken Time   /125 06/25/24 0853   Temp * 06/25/24 0856   Pulse 92 06/25/24 0856   Resp 15 06/25/24 0856   SpO2 96 % 06/25/24 0856   Vitals shown include unfiled device data.      No case tracking events are documented in the log.      Pain/Mikey Score: Pain Rating Prior to Med Admin: 0 (6/25/2024  5:53 AM)

## 2024-06-25 NOTE — TRANSFER OF CARE
Anesthesia Transfer of Care Note    Patient: Elissa Sorenson    Procedure(s) Performed: Procedure(s) (LRB):  ORIF, FRACTURE, HUMERUS (Right)    Patient location: PACU    Anesthesia Type: general    Transport from OR: Transported from OR on room air with adequate spontaneous ventilation    Post pain: adequate analgesia    Post assessment: no apparent anesthetic complications and tolerated procedure well    Post vital signs: stable    Level of consciousness: awake    Nausea/Vomiting: no nausea/vomiting    Complications: none    Transfer of care protocol was followed    Last vitals: Visit Vitals  BP (!) 144/92 (BP Location: Right arm, Patient Position: Lying)   Pulse 99   Temp 37.1 °C (98.7 °F) (Oral)   Resp 16   Ht 5' (1.524 m)   Wt 77.3 kg (170 lb 6.7 oz)   LMP 06/09/2024   SpO2 100%   Breastfeeding No   BMI 33.28 kg/m²     
Patent

## 2024-06-25 NOTE — ANESTHESIA PROCEDURE NOTES
Peripheral Block    Patient location during procedure: pre-op   Block not for primary anesthetic.  Reason for block: at surgeon's request and post-op pain management   Post-op Pain Location: Right Arm   Start time: 6/25/2024 6:48 AM  Timeout: 6/25/2024 6:47 AM   End time: 6/25/2024 12:52 AM    Staffing  Authorizing Provider: Panfilo Flores MD  Performing Provider: Panfilo Flores MD    Staffing  Performed by: Panfilo Flores MD  Authorized by: Panfilo Flores MD    Preanesthetic Checklist  Completed: patient identified, IV checked, site marked, risks and benefits discussed, surgical consent, monitors and equipment checked, pre-op evaluation and timeout performed  Peripheral Block  Patient position: sitting  Prep: ChloraPrep  Patient monitoring: heart rate, cardiac monitor, continuous pulse ox and frequent blood pressure checks  Block type: supraclavicular  Laterality: right  Injection technique: single shot  Needle  Needle type: Stimuplex   Needle gauge: 22 G  Needle length: 2 in  Needle localization: anatomical landmarks, ultrasound guidance and nerve stimulator   -ultrasound image captured on disc.  Assessment  Injection assessment: negative aspiration, negative parasthesia and local visualized surrounding nerve  Paresthesia pain: none  Heart rate change: no  Slow fractionated injection: yes  Pain Tolerance: comfortable throughout block and no complaints  Medications:    Medications: ropivacaine (NAROPIN) injection 0.5% - Perineural   35 mL - 6/25/2024 6:50:00 AM    Additional Notes  VSS.  [facility] RN monitoring vitals throughout procedure. U/S Image revealed normal appearing supraclavicular anatomy. Continuously aspirated between incremental injections (HEME - neg). Appropriate neuro-stimulator twitch stopped @ 0.90 mA. Patient tolerated procedure well.

## 2024-06-25 NOTE — DISCHARGE INSTRUCTIONS
-NO driving and NO alcohol consumption for 24 hours and while taking narcotic pain medication.    -Follow up appointment scheduled for: 7/18/24  at 1:00 pm     -Wound care: Remove sling after 24 hours when block wears off.    Begin daily dry dressing changes POD2(postop day 2). May cover with soft dressing or large band aid. Keep clean and dry. No showers to POD 7. Do not submerge. Do not apply ointments or creams.  Sling x 24 hours then remove for ROM (Range of motion ) of the elbow    -Weight bearing status:   NON-WEIGHT BEARING TO RIGHT UPPER EXTREMITY(NWB RUE, RANGE OF MOTION AS TOLERATED (ROMAT)    -Monitor for signs of INFECTION: redness, swelling, drainage/pus/foul odor, fever, chills.    -Monitor extremity for good circulation (pink, warm, etc). If you received a nerve block, it can last 8-12 hours.    -Apply ICE and ELEVATE extremity as needed to aid in pain and inflammation.    -Report to your nearest ER/Notify your doctor if you experience any SUDDEN/SEVERE chest pain/abdominal pain, weakness, trouble breathing, or uncontrolled pain.    BLEEDING: if you experience uncontrollable bleeding, contact your doctor and go to ER.    NAUSEA: due to the anesthesia, you may experience nausea for up to 24 hours. If nausea and vomiting last longer, contact your doctor.     INFECTION:  watch for any signs or symptoms of infection such as chills, fever, redness or drainage at surgical site. Notify your doctor.     PAIN : take your pain medications as directed. If the pain medications are not helping, notify your doctor.

## 2024-06-25 NOTE — DISCHARGE SUMMARY
[facility] Periop Services  Discharge Note  Short Stay    Procedure(s) (LRB):  ORIF, FRACTURE, HUMERUS (Right)      OUTCOME: Patient tolerated treatment/procedure well without complication and is now ready for discharge.    DISPOSITION: Home or Self Care    FINAL DIAGNOSIS:  Closed fracture of right upper limb    FOLLOWUP: In clinic    DISCHARGE INSTRUCTIONS:    Discharge Procedure Orders   Notify your health care provider if you experience any of the following:  temperature >100.4     Notify your health care provider if you experience any of the following:  redness, tenderness, or signs of infection (pain, swelling, redness, odor or green/yellow discharge around incision site)     Notify your health care provider if you experience any of the following:  severe uncontrolled pain     Remove dressing in 48 hours   Order Comments: Begin daily dry dressing changes POD2. May cover with soft dressing or large band aid. Keep clean and dry. No showers to POD 7. Do not submerge. Do not apply ointments or creams.  Sling x 24 hours then remove for ROM of the elbow     Weight bearing restrictions (specify):   Order Comments: ARLIN DOSS. Remove sling after 24 hours when block wears off.         Clinical Reference Documents Added to Patient Instructions         Document    CONSTIPATION, ADULT ED (ENGLISH)    OPEN REDUCTION AND INTERNAL FIXATION SURGERY DISCHARGE INSTRUCTIONS (ENGLISH)            TIME SPENT ON DISCHARGE: 15 minutes

## 2024-07-02 DIAGNOSIS — S42.301D CLOSED FRACTURE OF SHAFT OF RIGHT HUMERUS WITH ROUTINE HEALING, UNSPECIFIED FRACTURE MORPHOLOGY, SUBSEQUENT ENCOUNTER: Primary | ICD-10-CM

## 2024-07-03 DIAGNOSIS — S42.301D CLOSED FRACTURE OF SHAFT OF RIGHT HUMERUS WITH ROUTINE HEALING, UNSPECIFIED FRACTURE MORPHOLOGY, SUBSEQUENT ENCOUNTER: Primary | ICD-10-CM

## 2024-07-03 RX ORDER — METHOCARBAMOL 750 MG/1
750 TABLET, FILM COATED ORAL 3 TIMES DAILY
Qty: 30 TABLET | Refills: 0 | Status: SHIPPED | OUTPATIENT
Start: 2024-07-03 | End: 2024-07-13

## 2024-07-03 RX ORDER — OXYCODONE AND ACETAMINOPHEN 10; 325 MG/1; MG/1
1 TABLET ORAL EVERY 6 HOURS PRN
Qty: 28 TABLET | Refills: 0 | Status: SHIPPED | OUTPATIENT
Start: 2024-07-03 | End: 2024-07-10

## 2024-07-12 PROCEDURE — G0180 MD CERTIFICATION HHA PATIENT: HCPCS | Mod: ,,, | Performed by: ORTHOPAEDIC SURGERY

## 2024-07-16 ENCOUNTER — OFFICE VISIT (OUTPATIENT)
Dept: ORTHOPEDICS | Facility: CLINIC | Age: 39
End: 2024-07-16
Payer: COMMERCIAL

## 2024-07-16 ENCOUNTER — HOSPITAL ENCOUNTER (OUTPATIENT)
Dept: RADIOLOGY | Facility: CLINIC | Age: 39
Discharge: HOME | End: 2024-07-16
Attending: ORTHOPAEDIC SURGERY
Payer: COMMERCIAL

## 2024-07-16 ENCOUNTER — HOSPITAL ENCOUNTER (OUTPATIENT)
Dept: RADIOLOGY | Facility: CLINIC | Age: 39
Discharge: HOME | End: 2024-07-16
Attending: PHYSICIAN ASSISTANT
Payer: COMMERCIAL

## 2024-07-16 VITALS
WEIGHT: 170.44 LBS | HEIGHT: 60 IN | SYSTOLIC BLOOD PRESSURE: 139 MMHG | BODY MASS INDEX: 33.46 KG/M2 | HEART RATE: 81 BPM | DIASTOLIC BLOOD PRESSURE: 94 MMHG

## 2024-07-16 DIAGNOSIS — M25.551 RIGHT HIP PAIN: ICD-10-CM

## 2024-07-16 DIAGNOSIS — S42.301D CLOSED FRACTURE OF SHAFT OF RIGHT HUMERUS WITH ROUTINE HEALING, UNSPECIFIED FRACTURE MORPHOLOGY, SUBSEQUENT ENCOUNTER: ICD-10-CM

## 2024-07-16 DIAGNOSIS — S42.301D CLOSED FRACTURE OF SHAFT OF RIGHT HUMERUS WITH ROUTINE HEALING, UNSPECIFIED FRACTURE MORPHOLOGY, SUBSEQUENT ENCOUNTER: Primary | ICD-10-CM

## 2024-07-16 PROCEDURE — 73060 X-RAY EXAM OF HUMERUS: CPT | Mod: RT,,, | Performed by: ORTHOPAEDIC SURGERY

## 2024-07-16 PROCEDURE — 3075F SYST BP GE 130 - 139MM HG: CPT | Mod: CPTII,,, | Performed by: PHYSICIAN ASSISTANT

## 2024-07-16 PROCEDURE — 3080F DIAST BP >= 90 MM HG: CPT | Mod: CPTII,,, | Performed by: PHYSICIAN ASSISTANT

## 2024-07-16 PROCEDURE — 73502 X-RAY EXAM HIP UNI 2-3 VIEWS: CPT | Mod: RT,,, | Performed by: PHYSICIAN ASSISTANT

## 2024-07-16 PROCEDURE — 4010F ACE/ARB THERAPY RXD/TAKEN: CPT | Mod: CPTII,,, | Performed by: PHYSICIAN ASSISTANT

## 2024-07-16 PROCEDURE — 1159F MED LIST DOCD IN RCRD: CPT | Mod: CPTII,,, | Performed by: PHYSICIAN ASSISTANT

## 2024-07-16 PROCEDURE — 99024 POSTOP FOLLOW-UP VISIT: CPT | Mod: ,,, | Performed by: PHYSICIAN ASSISTANT

## 2024-07-16 RX ORDER — TRAMADOL HYDROCHLORIDE 50 MG/1
50 TABLET ORAL EVERY 6 HOURS PRN
Qty: 21 TABLET | Refills: 0 | Status: SHIPPED | OUTPATIENT
Start: 2024-07-16 | End: 2024-07-23

## 2024-07-16 NOTE — PROGRESS NOTES
Subjective:       Patient ID: Elissa Sorenson is a 39 y.o. female.  Chief Complaint   Patient presents with    Post-op Evaluation     3 wks, ORIF right humeral shaft fx SX 6/25/24-8/9/24, reports minimal pain, has no other complaints,         HPI    Patient presents for 3 week follow up ORIF right humeral shaft fracture. Doing well. Having minimal pain. Taking mostly over the counter meds but having pain at night.  Going on cruise next week. May questions about incision care activity. Again discussed WB status. She has no numbness or tingling distally to the RUE. Having some right hip pain. Worse since the fall to the right side.     ROS:  Constitutional: Denies fever chills  Eyes: No change in vision  ENT: No ringing or current infections  CV: No chest pain  Resp: No labored breathing  MSK: Pain evident at site of injury located in HPI,   Integ: No signs of abrasions or lacerations  Neuro: No numbness or tingling  Lymphatic: No swelling outside the area of injury     Current Outpatient Medications on File Prior to Visit   Medication Sig Dispense Refill    ascorbic acid, vitamin C, (VITAMIN C) 250 mg Chew Take 1 tablet by mouth Daily.      ferrous sulfate (FEOSOL) 325 mg (65 mg iron) Tab tablet Take 1 tablet by mouth once daily.      levothyroxine (SYNTHROID) 88 MCG tablet       hydrOXYzine (ATARAX) 50 MG tablet Take 50 mg by mouth every 6 (six) hours as needed.      ibuprofen (ADVIL,MOTRIN) 800 MG tablet Take 800 mg by mouth every 6 (six) hours as needed.      losartan (COZAAR) 50 MG tablet Take 50 mg by mouth once daily. Takes 0.5 tab PRN       No current facility-administered medications on file prior to visit.          Objective:      BP (!) 139/94   Pulse 81   Ht 5' (1.524 m)   Wt 77.3 kg (170 lb 6.7 oz)   LMP 06/09/2024 Comment: reports experiencing a full term miscarriage in 3/2024; Hcg levels checked up until alvina. surgery. UPT negative  BMI 33.28 kg/m²   Physical Exam  General the patient is  "alert and oriented x3 no acute distress nontoxic-appearing appropriate affect.    Constitutional: Vital signs are examined and stable.  Resp: No signs of labored breathing              RUE: -Skin: Incisions healing well without erythema, drainage, signs of dehiscence, staples removed today without complication; No signs of new abrasions or lacerations, no scars           -MSK: STR 5/5 AIN/PIN/Median/Radial/Ulnar motor intact.            -Neuro:  Sensation intact to light touch C5-T1 dermatomes           -Lymphatic: No signs of  lymphadenopathy,            -CV:  Capillary refill is less than 2 seconds. Radial and ulnar pulses 2/4. Compartments soft and compressible                   Body mass index is 33.28 kg/m².  Ideal body weight: 45.5 kg (100 lb 4.9 oz)  Adjusted ideal body weight: 58.2 kg (128 lb 5.6 oz)  No results found for: "HGBA1C"  No results found for: "HGB"  No results found for: "HCT"  No results found for: "IRON"  No components found for: "FROLATE"  No results found for: "[id number]"  No results found for: "WBC"    Radiology:  3 view x ray right humerus:hardware intact without loosening or failure. No consolidation as expected. Alignment well maintained.     3 view x ray right hip: no acute fracture or dislocation. No shortening. Stable alignment of pelvic ring on AP pelvis.       Assessment:         1. Closed fracture of shaft of right humerus with routine healing, unspecified fracture morphology, subsequent encounter  X-Ray Humerus 2 View Right    traMADoL (ULTRAM) 50 mg tablet    Ambulatory referral/consult to Physical/Occupational Therapy      2. Right hip pain  X-Ray Hip 2 or 3 views Right with Pelvis when performed              Plan:         Follow up in about 6 weeks (around 8/27/2024), or if symptoms worsen or fail to improve.    Elissa was seen today for post-op evaluation.    Diagnoses and all orders for this visit:    Closed fracture of shaft of right humerus with routine healing, " unspecified fracture morphology, subsequent encounter  -     X-Ray Humerus 2 View Right; Future  -     traMADoL (ULTRAM) 50 mg tablet; Take 1 tablet (50 mg total) by mouth every 6 (six) hours as needed for Pain.  -     Ambulatory referral/consult to Physical/Occupational Therapy; Future    Right hip pain  -     X-Ray Hip 2 or 3 views Right with Pelvis when performed; Future        - staples removed today without complication. Some stiffness to the elbow. Does have home PT. Recommend outpatient particularly if they are not working much with ROM. Outpatient PT order provided. Discussed stiffness  happens early if she does not mobilize.   - Tramadol per her request for something less strong for pain at night.   -discussed no pool/ocean x 2 weeks while incision matures and risk of infection. No ointments or creams. Okay to shower and pat dry.   - NWB and no driving until 8 weeks post op on 08/12. We will see her back near that time to progress activity and evaluate fracture healing.   -all questions answered to the best of my ability. No hip pathology noted on x ray today.   -ED precautions given    The above findings, diagnostics, and treatment plan were discussed with Dr. Flores who is in agreement with the plan of care except as stated in additional documentation.       Yvette Magaña PA-C          Future Appointments   Date Time Provider Department Center   8/19/2024  9:45 AM Edu Flores DO [facility]

## 2024-08-07 ENCOUNTER — EXTERNAL HOME HEALTH (OUTPATIENT)
Dept: HOME HEALTH SERVICES | Facility: HOSPITAL | Age: 39
End: 2024-08-07
Payer: COMMERCIAL

## 2024-08-14 ENCOUNTER — DOCUMENT SCAN (OUTPATIENT)
Dept: HOME HEALTH SERVICES | Facility: HOSPITAL | Age: 39
End: 2024-08-14
Payer: COMMERCIAL

## 2024-08-19 ENCOUNTER — HOSPITAL ENCOUNTER (OUTPATIENT)
Dept: RADIOLOGY | Facility: CLINIC | Age: 39
Discharge: HOME | End: 2024-08-19
Attending: ORTHOPAEDIC SURGERY
Payer: COMMERCIAL

## 2024-08-19 ENCOUNTER — OFFICE VISIT (OUTPATIENT)
Dept: ORTHOPEDICS | Facility: CLINIC | Age: 39
End: 2024-08-19
Payer: COMMERCIAL

## 2024-08-19 VITALS
SYSTOLIC BLOOD PRESSURE: 135 MMHG | WEIGHT: 170.44 LBS | HEART RATE: 74 BPM | BODY MASS INDEX: 33.46 KG/M2 | RESPIRATION RATE: 18 BRPM | DIASTOLIC BLOOD PRESSURE: 89 MMHG | HEIGHT: 60 IN

## 2024-08-19 DIAGNOSIS — S42.301D CLOSED FRACTURE OF SHAFT OF RIGHT HUMERUS WITH ROUTINE HEALING, UNSPECIFIED FRACTURE MORPHOLOGY, SUBSEQUENT ENCOUNTER: Primary | ICD-10-CM

## 2024-08-19 DIAGNOSIS — S42.301D CLOSED FRACTURE OF SHAFT OF RIGHT HUMERUS WITH ROUTINE HEALING, UNSPECIFIED FRACTURE MORPHOLOGY, SUBSEQUENT ENCOUNTER: ICD-10-CM

## 2024-08-19 PROCEDURE — 3075F SYST BP GE 130 - 139MM HG: CPT | Mod: CPTII,,, | Performed by: ORTHOPAEDIC SURGERY

## 2024-08-19 PROCEDURE — 73060 X-RAY EXAM OF HUMERUS: CPT | Mod: RT,,, | Performed by: ORTHOPAEDIC SURGERY

## 2024-08-19 PROCEDURE — 1159F MED LIST DOCD IN RCRD: CPT | Mod: CPTII,,, | Performed by: ORTHOPAEDIC SURGERY

## 2024-08-19 PROCEDURE — 99213 OFFICE O/P EST LOW 20 MIN: CPT | Mod: ,,, | Performed by: ORTHOPAEDIC SURGERY

## 2024-08-19 PROCEDURE — 3079F DIAST BP 80-89 MM HG: CPT | Mod: CPTII,,, | Performed by: ORTHOPAEDIC SURGERY

## 2024-08-19 PROCEDURE — 4010F ACE/ARB THERAPY RXD/TAKEN: CPT | Mod: CPTII,,, | Performed by: ORTHOPAEDIC SURGERY

## 2024-08-19 PROCEDURE — 3008F BODY MASS INDEX DOCD: CPT | Mod: CPTII,,, | Performed by: ORTHOPAEDIC SURGERY

## 2024-08-19 NOTE — PROGRESS NOTES
Subjective:       Patient ID: Elissa Sorenson is a 39 y.o. female.  Chief Complaint   Patient presents with    Right Upper Arm - Follow-up     8 WEEK F/U RIGHT HUMERUS SHAFT FX, IN THERAPY, REPORTS IMPROVEMENT.        HPI    Patient presents for 8week follow up ORIF right humeral shaft fracture. Doing well. Having minimal pain.  Some minimal stiffness with extension of the arm but no complaints.  Back to work with restrictions.  No numbness no tingling.      ROS:  Constitutional: Denies fever chills  Eyes: No change in vision  ENT: No ringing or current infections  CV: No chest pain  Resp: No labored breathing  MSK: Pain evident at site of injury located in HPI,   Integ: No signs of abrasions or lacerations  Neuro: No numbness or tingling  Lymphatic: No swelling outside the area of injury     Current Outpatient Medications on File Prior to Visit   Medication Sig Dispense Refill    ascorbic acid, vitamin C, (VITAMIN C) 250 mg Chew Take 1 tablet by mouth Daily.      ferrous sulfate (FEOSOL) 325 mg (65 mg iron) Tab tablet Take 1 tablet by mouth once daily.      levothyroxine (SYNTHROID) 88 MCG tablet       hydrOXYzine (ATARAX) 50 MG tablet Take 50 mg by mouth every 6 (six) hours as needed.      ibuprofen (ADVIL,MOTRIN) 800 MG tablet Take 800 mg by mouth every 6 (six) hours as needed.      losartan (COZAAR) 50 MG tablet Take 50 mg by mouth once daily. Takes 0.5 tab PRN       No current facility-administered medications on file prior to visit.          Objective:      /89   Pulse 74   Resp 18   Ht 5' (1.524 m)   Wt 77.3 kg (170 lb 6.7 oz)   BMI 33.28 kg/m²   Physical Exam  General the patient is alert and oriented x3 no acute distress nontoxic-appearing appropriate affect.    Constitutional: Vital signs are examined and stable.  Resp: No signs of labored breathing              RUE: -Skin:  Near full range of motion 3° less of full extension         -MSK: STR 5/5 AIN/PIN/Median/Radial/Ulnar motor  "intact.            -Neuro:  Sensation intact to light touch C5-T1 dermatomes           -Lymphatic: No signs of  lymphadenopathy,            -CV:  Capillary refill is less than 2 seconds. Radial and ulnar pulses 2/4. Compartments soft and compressible                   Body mass index is 33.28 kg/m².  Ideal body weight: 45.5 kg (100 lb 4.9 oz)  Adjusted ideal body weight: 58.2 kg (128 lb 5.6 oz)  No results found for: "HGBA1C"  No results found for: "HGB"  No results found for: "HCT"  No results found for: "IRON"  No components found for: "FROLATE"  No results found for: "[id number]"  No results found for: "WBC"    Radiology:  3 view x ray right humerus:hardware intact without loosening or failure.  Consolidation appreciated i with signs of healing.    3 view x ray right hip: no acute fracture or dislocation. No shortening. Stable alignment of pelvic ring on AP pelvis.       Assessment:         1. Closed fracture of shaft of right humerus with routine healing, unspecified fracture morphology, subsequent encounter  X-Ray Humerus 2 View Right              Plan:         No follow-ups on file.    Elissa was seen today for follow-up.    Diagnoses and all orders for this visit:    Closed fracture of shaft of right humerus with routine healing, unspecified fracture morphology, subsequent encounter  -     X-Ray Humerus 2 View Right; Future        - patient is doing very well.  She has near full range of motion minimal pain.  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  She is healing with good callus.  Hardware is intact.  Minimal symptoms.  We will let her go back to work with a 20 lb weight restriction until next visit.  Likely return back to full duty next visit.  Patient understands.  We have discussed hardware removal.  She will take over-the-counter pain medication.  Work note given to her.  She is starting outpatient physical therapy next week.      This note/OR report was created with the " assistance of  voice recognition software or phone  dictation.  There may be transcription errors as a result of using this technology however minimal. Effort has been made to assure accuracy of transcription but any obvious errors or omissions should be clarified with the author of the document.       Edu Flores, DO  Orthopedic Trauma Surgery       No future appointments.

## 2024-08-19 NOTE — LETTER
[facility]  [address] [place] [address]. [address] [place], [address]  Phone: [phone]  Fax: [phone]    Name:Elissa Sorenson  :1985  Date:2024     PATIENT IS ABLE TO RETURN TO WORK AS OF :24    [_] SEDENTARY WORK: Lifting 10 pounds maximum and occasionally lifting and/or carrying articles such as dockers, ledgers and small tools.  Although a sedentary job is defined as one which involved sitting, a certain amount of walking and standing are required only occasionally and other sedentary criteria are met.    [X] LIGHT WORK: Lifting 20 pounds with frequent lifting and/or carrying objects weighing up to 10 pounds.  Even though the weight lifted may be only a negotiable amount, a job is in the category when it involves sitting most of the time with a degree of pushing/pulling of arm and/or leg controls.    [_] MEDIUM WORK: Lifting of 50 pounds maximum with frequent lifting and/or carrying of objects up to 25 pounds.    [_] HEAVY WORK: Lifting of 100 pounds maximum with frequent lifting and/or carrying objects up to 50 pounds.    [_] VERY HEAVY WORK: Lifting objects in excess of 100 pounds with frequent lifting and/or carrying of objects weighing 50 pounds or more.    [_] REGULAR DUTY: [_] No Restrictions. [_] With Restrictions (See comments below):    COMMENTS       Edu Flores,

## 2024-08-27 ENCOUNTER — DOCUMENT SCAN (OUTPATIENT)
Dept: HOME HEALTH SERVICES | Facility: HOSPITAL | Age: 39
End: 2024-08-27
Payer: COMMERCIAL

## 2024-11-05 ENCOUNTER — OFFICE VISIT (OUTPATIENT)
Dept: ORTHOPEDICS | Facility: CLINIC | Age: 39
End: 2024-11-05
Payer: COMMERCIAL

## 2024-11-05 ENCOUNTER — HOSPITAL ENCOUNTER (OUTPATIENT)
Dept: RADIOLOGY | Facility: CLINIC | Age: 39
Discharge: HOME | End: 2024-11-05
Attending: ORTHOPAEDIC SURGERY
Payer: COMMERCIAL

## 2024-11-05 VITALS
WEIGHT: 169.75 LBS | HEIGHT: 60 IN | BODY MASS INDEX: 33.33 KG/M2 | HEART RATE: 87 BPM | SYSTOLIC BLOOD PRESSURE: 144 MMHG | DIASTOLIC BLOOD PRESSURE: 87 MMHG

## 2024-11-05 DIAGNOSIS — S42.301D CLOSED FRACTURE OF SHAFT OF RIGHT HUMERUS WITH ROUTINE HEALING, UNSPECIFIED FRACTURE MORPHOLOGY, SUBSEQUENT ENCOUNTER: ICD-10-CM

## 2024-11-05 DIAGNOSIS — S42.301D CLOSED FRACTURE OF SHAFT OF RIGHT HUMERUS WITH ROUTINE HEALING, UNSPECIFIED FRACTURE MORPHOLOGY, SUBSEQUENT ENCOUNTER: Primary | ICD-10-CM

## 2024-11-05 PROCEDURE — 4010F ACE/ARB THERAPY RXD/TAKEN: CPT | Mod: CPTII,,, | Performed by: ORTHOPAEDIC SURGERY

## 2024-11-05 PROCEDURE — 3077F SYST BP >= 140 MM HG: CPT | Mod: CPTII,,, | Performed by: ORTHOPAEDIC SURGERY

## 2024-11-05 PROCEDURE — 1159F MED LIST DOCD IN RCRD: CPT | Mod: CPTII,,, | Performed by: ORTHOPAEDIC SURGERY

## 2024-11-05 PROCEDURE — 3008F BODY MASS INDEX DOCD: CPT | Mod: CPTII,,, | Performed by: ORTHOPAEDIC SURGERY

## 2024-11-05 PROCEDURE — 3079F DIAST BP 80-89 MM HG: CPT | Mod: CPTII,,, | Performed by: ORTHOPAEDIC SURGERY

## 2024-11-05 PROCEDURE — 73060 X-RAY EXAM OF HUMERUS: CPT | Mod: RT,,, | Performed by: ORTHOPAEDIC SURGERY

## 2024-11-05 PROCEDURE — 99213 OFFICE O/P EST LOW 20 MIN: CPT | Mod: ,,, | Performed by: ORTHOPAEDIC SURGERY

## 2024-11-05 RX ORDER — DESVENLAFAXINE SUCCINATE 25 MG/1
1 TABLET, EXTENDED RELEASE ORAL
COMMUNITY
Start: 2024-08-12

## 2024-11-05 RX ORDER — DESVENLAFAXINE 50 MG/1
50 TABLET, FILM COATED, EXTENDED RELEASE ORAL
COMMUNITY
Start: 2024-10-21

## 2024-11-05 RX ORDER — BUSPIRONE HYDROCHLORIDE 5 MG/1
5 TABLET ORAL 2 TIMES DAILY PRN
COMMUNITY
Start: 2024-10-16

## 2024-11-05 RX ORDER — LORAZEPAM 0.5 MG/1
0.5 TABLET ORAL DAILY PRN
COMMUNITY
Start: 2024-08-12

## 2024-11-05 NOTE — PROGRESS NOTES
Subjective:       Patient ID: Elissa Sorenson is a 39 y.o. female.  Chief Complaint   Patient presents with    Right Upper Arm - Follow-up     4 month f/u from ORIF right humeral shaft fx. Reports improvement in pain and discomfort         Follow-up      History of Present Illness    HPI:  Ms. Gabrielle sorenson is 4.5 months out from a right humeral shaft fracture. She is doing fine and believes everything is back to normal. She denies any numbness or tingling and states she has returned to all desired activities. She is already performing all work duties but requests a note stating she is released to do everything.    She denies any limitations in activities or work duties.    IMAGING:  X-rays of the right humerus were taken, consisting of two views. The images reveal a skeletally mature individual with intact hardware and a completely healed union fracture. The fracture appears to be in anatomic alignment, and there are no signs of hardware breakage.    SOCIAL HISTORY:  Ms. Gabrielle sorenson reports no current smoking.    WORK STATUS:  Ms. Gabrielle sorenson is currently working and appears to be on full duty. The practitioner provided a work note for full duty, effective from the day of the appointment.      ROS:  Neurological: denies numbness           Patient presents for 8week follow up ORIF right humeral shaft fracture. Doing well. Having minimal pain.  Some minimal stiffness with extension of the arm but no complaints.  Back to work with restrictions.  No numbness no tingling.      ROS:  Constitutional: Denies fever chills  Eyes: No change in vision  ENT: No ringing or current infections  CV: No chest pain  Resp: No labored breathing  MSK: Pain evident at site of injury located in HPI,   Integ: No signs of abrasions or lacerations  Neuro: No numbness or tingling  Lymphatic: No swelling outside the area of injury     Current Outpatient Medications on File Prior to Visit   Medication Sig Dispense Refill     "busPIRone (BUSPAR) 5 MG Tab Take 5 mg by mouth 2 (two) times daily as needed.      desvenlafaxine succinate (PRISTIQ) 25 mg Tb24 Take 1 tablet by mouth.      desvenlafaxine succinate (PRISTIQ) 50 MG Tb24 Take 50 mg by mouth.      ferrous sulfate (FEOSOL) 325 mg (65 mg iron) Tab tablet Take 1 tablet by mouth once daily.      levothyroxine (SYNTHROID) 88 MCG tablet       LORazepam (ATIVAN) 0.5 MG tablet Take 0.5 mg by mouth daily as needed.      ascorbic acid, vitamin C, (VITAMIN C) 250 mg Chew Take 1 tablet by mouth Daily.      hydrOXYzine (ATARAX) 50 MG tablet Take 50 mg by mouth every 6 (six) hours as needed.      ibuprofen (ADVIL,MOTRIN) 800 MG tablet Take 800 mg by mouth every 6 (six) hours as needed.      losartan (COZAAR) 50 MG tablet Take 50 mg by mouth once daily. Takes 0.5 tab PRN       No current facility-administered medications on file prior to visit.          Objective:      BP (!) 144/87   Pulse 87   Ht 5' (1.524 m)   Wt 77 kg (169 lb 12.1 oz)   BMI 33.15 kg/m²   Physical Exam  General the patient is alert and oriented x3 no acute distress nontoxic-appearing appropriate affect.    Constitutional: Vital signs are examined and stable.  Resp: No signs of labored breathing              RUE: -Skin:  Full range of motion flexion-extension supination pronation    -MSK: STR 5/5 AIN/PIN/Median/Radial/Ulnar motor intact.            -Neuro:  Sensation intact to light touch C5-T1 dermatomes           -Lymphatic: No signs of  lymphadenopathy,            -CV:  Capillary refill is less than 2 seconds. Radial and ulnar pulses 2/4. Compartments soft and compressible                   Body mass index is 33.15 kg/m².  Ideal body weight: 45.5 kg (100 lb 4.9 oz)  Adjusted ideal body weight: 58.1 kg (128 lb 1.4 oz)  No results found for: "HGBA1C"  No results found for: "HGB"  No results found for: "HCT"  No results found for: "IRON"  No components found for: "FROLATE"  No results found for: "[id number]"  No results " "found for: "WBC"    Radiology:  3 view x ray right humerus:hardware intact without loosening or failure.  Fracture union          Assessment:         1. Closed fracture of shaft of right humerus with routine healing, unspecified fracture morphology, subsequent encounter  X-Ray Humerus 2 View Right              Plan:         No follow-ups on file.    Elissa was seen today for follow-up.    Diagnoses and all orders for this visit:    Closed fracture of shaft of right humerus with routine healing, unspecified fracture morphology, subsequent encounter  -     X-Ray Humerus 2 View Right; Future      Assessment & Plan    PLAN SUMMARY:  - X-ray of right humerus performed  - Released to full duty at work  - Hardware removal not recommended due to good tolerance  - Follow up as needed (PRN)  - Work note provided for full duty and full activities    FOLLOW UP:  - Follow up as needed (PRN).    RETURN TO ACTIVITY:  - Released to full duty at work. Work note provided for full duty and full activities, dated for today.    IMAGING ORDERS:  - X-ray of right humerus performed. Results showed completely healed union fracture in anatomic alignment with intact hardware and no sign of hardware breakage.    PROCEDURES:  - Discussed possibility of hardware removal but not recommended due to good tolerance of humeral plates.        Patient doing very well follow up as needed.  Full range of motion full strength no sign of neurovascular injury    This note was generated with the assistance of ambient listening technology. Verbal consent was obtained by the patient and accompanying visitor(s) for the recording of patient appointment to facilitate this note. I attest to having reviewed and edited the generated note for accuracy, though some syntax or spelling errors may persist. Please contact the author of this note for any clarification.        -    This note/OR report was created with the assistance of  voice recognition software or phone  " dictation.  There may be transcription errors as a result of using this technology however minimal. Effort has been made to assure accuracy of transcription but any obvious errors or omissions should be clarified with the author of the document.       Edu Flores, DO  Orthopedic Trauma Surgery       No future appointments.

## 2024-11-05 NOTE — LETTER
[facility]  [address] [place] [address]. [address] [place], [address]  Phone: [phone]  Fax: [phone]    Name:Elissa Sorenson  :1985  Date:2024     PATIENT IS ABLE TO RETURN TO WORK AS OF: 24    [_] SEDENTARY WORK: Lifting 10 pounds maximum and occasionally lifting and/or carrying articles such as dockers, ledgers and small tools.  Although a sedentary job is defined as one which involved sitting, a certain amount of walking and standing are required only occasionally and other sedentary criteria are met.    [_] LIGHT WORK: Lifting 20 pounds with frequent lifting and/or carrying objects weighing up to 10 pounds.  Even though the weight lifted may be only a negotiable amount, a job is in the category when it involves sitting most of the time with a degree of pushing/pulling of arm and/or leg controls.    [_] MEDIUM WORK: Lifting of 50 pounds maximum with frequent lifting and/or carrying of objects up to 25 pounds.    [_] HEAVY WORK: Lifting of 100 pounds maximum with frequent lifting and/or carrying objects up to 50 pounds.    [_] VERY HEAVY WORK: Lifting objects in excess of 100 pounds with frequent lifting and/or carrying of objects weighing 50 pounds or more.    [X] REGULAR DUTY: [X] No Restrictions. [_] With Restrictions (See comments below):    COMMENTS     Edu Flores,

## (undated) DEVICE — ELECTRODE REM POLYHESIVE II

## (undated) DEVICE — GLOVE PROTEXIS LTX MICRO 8

## (undated) DEVICE — TAPE SILK 3IN

## (undated) DEVICE — BANDAGE VELCLOSE ELAS 4INX5YD

## (undated) DEVICE — GLOVE PROTEXIS BLUE LATEX 9

## (undated) DEVICE — SPONGE COTTON TRAY 4X4IN

## (undated) DEVICE — DRAPE SHOULDER BEACH CHAIR

## (undated) DEVICE — APPLICATOR CHLORAPREP ORN 26ML

## (undated) DEVICE — IMPLANTABLE DEVICE
Type: IMPLANTABLE DEVICE | Site: HUMERUS | Status: NON-FUNCTIONAL
Removed: 2024-06-25

## (undated) DEVICE — BNDG COFLEX FOAM LF2 ST 4X5YD

## (undated) DEVICE — SEE MEDLINE ITEM 157166

## (undated) DEVICE — GLOVE SIGNATURE MICRO LTX 6

## (undated) DEVICE — GLOVE PROTEXIS HYDROGEL SZ9

## (undated) DEVICE — GLOVE PROTEXIS NEU-THERA SZ6

## (undated) DEVICE — Device

## (undated) DEVICE — DRAPE FULL SHEET 70X100IN

## (undated) DEVICE — SUT MCRYL PLUS 2-0 CT-1 36IN

## (undated) DEVICE — STAPLER SKIN PROXIMATE WIDE

## (undated) DEVICE — GOWN SMARTGOWN 3XL XLONG

## (undated) DEVICE — SOL NACL IRR 1000ML BTL

## (undated) DEVICE — KIT SURGICAL TURNOVER

## (undated) DEVICE — SPONGE LAP 18X18 PREWASHED

## (undated) DEVICE — DRAPE ORTH SPLIT 77X108IN

## (undated) DEVICE — SLING ARM COMFT NAVY BLU MED

## (undated) DEVICE — COVER TABLE HVY DTY 60X90IN

## (undated) DEVICE — DRIVER UNIV QUICK CONN T15

## (undated) DEVICE — DRILL DISTAL ELBOW 3.5X50MM

## (undated) DEVICE — COVER FULLGUARD SHOE HIGH-TOP

## (undated) DEVICE — ELECTRODE PATIENT RETURN DISP

## (undated) DEVICE — SOCKINETTE IMPERVIOS 6X48

## (undated) DEVICE — SUT VICRYL BR 1 GEN 27 CT-1

## (undated) DEVICE — DRAPE STERI U-SHAPED 47X51IN